# Patient Record
Sex: FEMALE | Race: BLACK OR AFRICAN AMERICAN | Employment: OTHER | URBAN - METROPOLITAN AREA
[De-identification: names, ages, dates, MRNs, and addresses within clinical notes are randomized per-mention and may not be internally consistent; named-entity substitution may affect disease eponyms.]

---

## 2020-01-06 ENCOUNTER — OFFICE VISIT (OUTPATIENT)
Dept: INTERNAL MEDICINE CLINIC | Age: 45
End: 2020-01-06

## 2020-01-06 VITALS
DIASTOLIC BLOOD PRESSURE: 70 MMHG | TEMPERATURE: 97.7 F | WEIGHT: 293 LBS | HEART RATE: 93 BPM | RESPIRATION RATE: 16 BRPM | BODY MASS INDEX: 47.09 KG/M2 | SYSTOLIC BLOOD PRESSURE: 104 MMHG | HEIGHT: 66 IN | OXYGEN SATURATION: 99 %

## 2020-01-06 DIAGNOSIS — Z13.1 SCREENING FOR DIABETES MELLITUS (DM): ICD-10-CM

## 2020-01-06 DIAGNOSIS — M15.9 OSTEOARTHRITIS OF MULTIPLE JOINTS, UNSPECIFIED OSTEOARTHRITIS TYPE: ICD-10-CM

## 2020-01-06 DIAGNOSIS — R10.13 EPIGASTRIC PAIN: ICD-10-CM

## 2020-01-06 DIAGNOSIS — Z79.899 LONG TERM CURRENT USE OF DIURETIC: ICD-10-CM

## 2020-01-06 DIAGNOSIS — Z76.89 ESTABLISHING CARE WITH NEW DOCTOR, ENCOUNTER FOR: ICD-10-CM

## 2020-01-06 DIAGNOSIS — R31.29 MICROSCOPIC HEMATURIA: ICD-10-CM

## 2020-01-06 DIAGNOSIS — F31.9 BIPOLAR AFFECTIVE DISORDER, REMISSION STATUS UNSPECIFIED (HCC): ICD-10-CM

## 2020-01-06 DIAGNOSIS — M79.7 FIBROMYALGIA: Primary | ICD-10-CM

## 2020-01-06 DIAGNOSIS — Z00.00 HEALTH CARE MAINTENANCE: ICD-10-CM

## 2020-01-06 DIAGNOSIS — J45.909 ASTHMA, UNSPECIFIED ASTHMA SEVERITY, UNSPECIFIED WHETHER COMPLICATED, UNSPECIFIED WHETHER PERSISTENT: ICD-10-CM

## 2020-01-06 DIAGNOSIS — F43.10 PTSD (POST-TRAUMATIC STRESS DISORDER): ICD-10-CM

## 2020-01-06 PROBLEM — E66.01 OBESITY, MORBID (HCC): Status: ACTIVE | Noted: 2020-01-06

## 2020-01-06 LAB
BILIRUB UR QL STRIP: NEGATIVE
GLUCOSE UR-MCNC: NEGATIVE MG/DL
HBA1C MFR BLD HPLC: 5.1 %
HGB BLD-MCNC: 109 G/DL
KETONES P FAST UR STRIP-MCNC: NEGATIVE MG/DL
PH UR STRIP: 7 [PH] (ref 4.6–8)
PROT UR QL STRIP: NEGATIVE
SP GR UR STRIP: 1.02 (ref 1–1.03)
UA UROBILINOGEN AMB POC: NORMAL (ref 0.2–1)
URINALYSIS CLARITY POC: CLEAR
URINALYSIS COLOR POC: YELLOW
URINE BLOOD POC: NORMAL
URINE LEUKOCYTES POC: NEGATIVE
URINE NITRITES POC: NEGATIVE

## 2020-01-06 RX ORDER — OXYCODONE HYDROCHLORIDE 20 MG/1
TABLET ORAL
COMMUNITY
Start: 2019-11-19

## 2020-01-06 NOTE — PROGRESS NOTES
Chief Complaint   Patient presents with   [facility] Establish Care     1. Have you been to the ER, urgent care clinic since your last visit? Hospitalized since your last visit? No    2. Have you seen or consulted any other health care providers outside of the [address] [place] [address] since your last visit? Include any pap smears or colon screening.  No

## 2020-01-06 NOTE — PATIENT INSTRUCTIONS
Return to get your blood work done as soon as possible on:    Monday - Thursday , 8 am to 12:00 noon.    Tell the [employer] you are just here for the lab.   ----------------------------------------------

## 2020-01-08 PROBLEM — Z98.84 HX OF BARIATRIC SURGERY: Status: ACTIVE | Noted: 2020-01-08

## 2020-01-08 NOTE — PROGRESS NOTES
CC: [facility]    HPI:     Ruba Torres is a 50 y.o. female s/p gastric bypass who presents with a chief complaint of [facility]   and with other medical problems:    FIBROMYALGIA  - generalized pain \"all over\" x 12 yrs; controlled in past through a Pain Management practice    DJD - HIPS, KNEES, ANKLE  - pain; dx'd 3 yrs ago    ASTHMA  - uses purple steroid inhaler every day, albuterol as needed    EPIGASTRIC PAIN  - X 8 YRS  - NO KNOWN LIVER, Pancreas, gastric or other abdominal disorder    PTSD  BIPOLAR  - X 10 - 20 YRS; controlled w/ Cymbalta, therapy  - no SI/HI    Allergies   Allergen Reactions    Nsaids (Non-Steroidal Anti-Inflammatory Drug) Other (comments)     Gastric bypass      Current Outpatient Medications on File Prior to Visit   Medication Sig Dispense Refill    oxyCODONE IR (ROXICODONE) 20 mg immediate release tablet TK 1 T PO Q 6 H PRN      DULoxetine (CYMBALTA) 60 mg capsule Take 60 mg by mouth daily.  melatonin 3 mg tablet Take 3 mg by mouth nightly.  IRON, FERROUS SULFATE, PO Take 27 mg by mouth daily.  hydrOXYzine (VISTARIL) 50 mg capsule Take 100 mg by mouth three (3) times daily as needed for Itching.  CALCIUM CITRATE/VITAMIN D2 (CALCIUM CITRATE WITH D PO) Take 1 Tab by mouth two (2) times a day.  pregabalin (LYRICA) 150 mg capsule Take 150 mg by mouth two (2) times a day.  albuterol (PROAIR HFA) 90 mcg/actuation inhaler Take 2 Puffs by inhalation every four (4) hours as needed for Wheezing.  multivitamin (ONE A DAY) tablet Take 1 Tab by mouth daily.  cyanocobalamin (VITAMIN B-12) 1,000 mcg tablet Take 1,000 mcg by mouth two (2) times a day.  HYDROcodone-acetaminophen (NORCO) 5-325 mg per tablet Take 1 Tab by mouth every six (6) hours as needed for Pain. Max Daily Amount: 4 Tabs. 5 Tab 0    furosemide (LASIX) 20 mg tablet Take 20 mg by mouth daily.  polyethylene glycol (MIRALAX) 17 gram packet Take 17 g by mouth daily.  esomeprazole (NEXIUM) 40 mg capsule Take 40 mg by mouth two (2) times a day. No current facility-administered medications on file prior to visit. ASSESSMENT  TREATMENT  PLAN:    1. Fibromyalgia  Chronic  - REFERRAL TO PAIN CLINIC    2. Osteoarthritis of multiple joints, unspecified osteoarthritis type  Need old records    3. Asthma, unspecified asthma severity, unspecified whether complicated, unspecified whether persistent  Controlled; Contin inhalers    4. PTSD (post-traumatic stress disorder)  5. Bipolar affective disorder, remission status unspecified ([facility]  Contin meds, therapy; psychiatrist for med management    6. Long term current use of diuretic  Check BMP  - GENERAL HEALTH PANEL    7. Microscopic hematuria  R.o UTI  - CULTURE, URINE    11. Epigastric pain  Unresolved  - Check lipase, old records    8. Health care maintenance  9. Screening for diabetes mellitus (DM)  10. Establishing care with new doctor, encounter for  - AMB POC HEMOGLOBIN (HGB)  - AMB POC HEMOGLOBIN A1C  - AMB POC URINALYSIS DIP STICK AUTO W/O MICRO  - GENERAL HEALTH PANEL    Patient Instructions   Return to get your blood work done as soon as possible on:    Monday - Thursday , 8 am to 12:00 noon. Tell the [employer] you are just here for the lab.   ----------------------------------------------      EXAM:    CONSTITUTIONAL:     Vitals:    01/06/20 1321   BP: 104/70   Pulse: 93   Resp: 16   Temp: 97.7 °F (36.5 °C)   TempSrc: Oral   SpO2: 99%   Weight: 321 lb (145.6 kg)   Height: 5' 6\" (1.676 m)   PainSc:   8   PainLoc: Generalized   :3  Weight Metrics 1/6/2020 7/22/2015 3/6/2015 12/16/2013 11/17/2013 3/31/2012 12/8/2011   Weight 321 lb 278 lb 7.1 oz 263 lb 7.2 oz 239 lb 3.2 oz 220 lb 207 lb 3.7 oz 195 lb   BMI 51.81 kg/m2 44.96 kg/m2 42.54 kg/m2 38.63 kg/m2 35.53 kg/m2 33.46 kg/m2 31.49 kg/m2     General:  WD morbidly obese appropriately groomed  female in NAD. EYES:   POSITIVE:  none.  Except for Positive findings listed, this system was WNL. My WNL exam this visit:   Conjunctivae & lids w/o erythema or discharge. EARS, NOSE, MOUTH & THROAT:   POSITIVE:  none. Except for Positive findings listed, this system was WNL. My WNL exam this visit:  External ears & nose WNL w/o scars, lesions or masses. HEAD & NECK:   POSITIVE:  none. Except for Positive findings listed, this system was WNL. My WNL exam this visit:  Atraumatic, normocephalic. Symmetrical w/o masses, tenderness, tracheal deviation, crepitus.  Thyroid w/o enlargement, tenderness, mass. RESPIRATORY:   POSITIVE:  none. Except for Positive findings listed, this system was WNL. My WNL exam this visit:  Effort WNL; no intercostal retractions or accessory muscle use; diaphragmatic movement WNL.  Breath sounds: good air entry, no adventitious sounds; no rales, wheezes, rhonchi or rubs.  No dullness, flatness or hyperresonance. CARDIOVASCULAR:  POSITIVE:  1/6 systolic murmur. PMI nonpalpable. Except for Positive findings listed, this system was WNL. My WNL exam this visit:    Heart - w/o thrills. PMI non-displaced.  S1, S2 normal rate, regular rhythm. No murmurs, gallops, clicks or rubs. Vascular  carotid pulses WNL; no bruits  abdominal aorta size WNL; no bruits  Extremities negative for edema or varicosities. GASTROINTESTINAL (Abdomen):   POSITIVE:  Obese. Except for Positive findings listed, this system was WNL. My WNL exam:  Flat; NABS w/o masses or tenderness  Liver 6 cm in RMCL. Liver & spleen w/o organomegaly.  No hernias palpable. SKIN & SUBCUTANEOUS TISSUE:  POSITIVE:  none. Except for Positive findings listed, this system was WNL. My WNL exam this visit:   No rashes, lesions, ulcers.  No induration, subcutaneous nodules, tightening. PSYCHIATRIC:  POSITIVE findings:  No SI/HI. Except for Positive findings listed, this system was WNL.  My WNL exam:  · Speech: rate, volume, articulation, coherence, and spontaneity WNL w/o perseveration, paucity of language or pressured speech  · Thought processes: Rate of thoughts, content of thoughts WNL; logical, not tangential.   · Associations: Intact; not loose, tangential, circumstantial.   · Abnormal or psychotic thoughts not currently present. No homicidal or suicidal ideation. No hallucinations, delusions, preoccupation with violence, obsessions   · Judgment concerning everyday activities & social situations, good. Insight concerning psychiatric condition, good  Mental status:  · Oriented to person, place and time. · Recent and remote memory good for recall of events, times and discussions during visit. · Attention span and concentration good. · Fund of knowledge: vocabulary, good. · Mood and affect: Not depressed or anxious w/o agitation, anger, aggression, hypomania or lability. HISTORY - ROS  PAST  FAMILY  SURGICAL  SOCIAL  with PROBLEM LIST:    ROS - Review of Systems    EXCEPT FOR POSITIVES LISTED, the Review of Systems below was negative or within normal limits  CONSTITUTIONAL:  Positive for: Chills x 6-8mos. Night sweats. Malaise. Wt gain. Fever  chills  night sweats  fatigue  malaise  weakness  anorexia  wt loss or gain  EYES:  Positive for: Glasses. Vision loss, blurred, double  color blind  discharge  irritation  glasses/contacts  eye exam:___/___ /____  H&NENT:  Positive for: Bad teth. Head trauma  deafness  tinnitus  vertigo  ear discharge or pain  rhinitis  sinusitis  nasal drainage or congestion  epistaxis  sore mouth / tongue / throat  tonsillitis  voice changes  hoarseness  bad teeth or gums  RESPIRATORY:  Positive for: SOB. Asthma. Productive cough. SOB  wheezing  cough  sputum  hemoptysis  asthma / COPD  pneumonia  TB/TB exposure  pleuritis  CARDIOVASCULAR:  Positivefor: Diaphoresis, BUENO, tachycardia or palpitations when asthma flares.   Chest pain  diaphoresis  BUENO  tachycardia  palpitations  LE edema  orthopnea PND  lightheaded  syncope  GASTROINTESTINAL:  Positive for: Abdominal pain, nausea. Nausea  vomiting  constipation  diarrhea  abdominal pain  hematochezia  melena  hematemesis  dysphagia  indigestion  acid reflux/GERD  hepatitis  liver problems  jaundice  GENITOURINARY:  Positive for: Urinates a lot, moderate amts. Frequency  dysuria  hematuria  urine odor  urethral/vaginal discharge  urgency  hesitancy  incomplete emptying  weak stream  SKINBREASTS:  Positive for: none. Rash  itching  whelps  bruises  sores  breast lumps  MUSCULOSKELETAL:  Positive for: Arthralgia, joint stiffness, muscle pain & weakness. Back pain. Joint pain, stiffness, effusion, limited movement  muscle pain, weakness  back neck pain  fracture(s)  NEUROLOGICAL:  Positive for: headaches, migraines, memory loss, gait difficulties. Headaches  migraines  dizzy, lightheaded  vertigo  seizure  memory loss  gait problems   HEMELYMPH:  Positive for: none. Bruise, bleed easily  bleeding gums  lymphadenopathy  ENDOCRINE:  Positive for: Polydipsia, heat intolerance. Polyuria  polydipsia  polyphagia  heat or cold intolerance  goiter  thyroid problems  PSYCHIATRIC:  Posiive for: Anxiety, depression, mood swings.   Anxiety  depression  suicidal or homicidal thoughts  mood swings      PFSSH:  Active Ambulatory Problems     Diagnosis Date Noted    Mood disorder due to a general medical condition 11/18/2013    Obesity, morbid ([facility] 01/06/2020     Resolved Ambulatory Problems     Diagnosis Date Noted    No Resolved Ambulatory Problems     Past Medical History:   Diagnosis Date    Asthma     Bipolar disorder ([facility]     Chronic pain     Fibromyalgia     Gastric ulcer     Ill-defined condition     Ill-defined condition     Ill-defined disease     Ischemic bowel disease ([facility]     PTSD (post-traumatic stress disorder)      Past Surgical History:   Procedure Laterality Date    HX BLADDER SUSPENSION  HX GASTRIC BYPASS      HX SMALL BOWEL RESECTION       Social History     Tobacco Use    Smoking status: Current Every Day Smoker     Packs/day: 1.00     Years: 10.00     Pack years: 10.00    Smokeless tobacco: Never Used   Substance Use Topics    Alcohol use: No     History reviewed. No pertinent family history. RESULTS - This Visit Only (only some results were available at the time of visit)  No results found for this visit on 01/06/20.

## 2020-03-31 LAB — CREATININE, EXTERNAL: 0.86

## 2020-08-20 VITALS
BODY MASS INDEX: 47.09 KG/M2 | SYSTOLIC BLOOD PRESSURE: 140 MMHG | WEIGHT: 293 LBS | DIASTOLIC BLOOD PRESSURE: 80 MMHG | HEIGHT: 66 IN

## 2020-08-20 PROBLEM — M25.562 PAIN IN BOTH KNEES: Status: ACTIVE | Noted: 2020-08-20

## 2020-08-20 PROBLEM — L30.4 INTERTRIGO: Status: ACTIVE | Noted: 2020-08-20

## 2020-08-20 PROBLEM — H92.02 OTALGIA OF LEFT EAR: Status: ACTIVE | Noted: 2020-08-20

## 2020-08-20 PROBLEM — M25.561 PAIN IN BOTH KNEES: Status: ACTIVE | Noted: 2020-08-20

## 2020-08-20 PROBLEM — R07.9 CHEST PAIN: Status: ACTIVE | Noted: 2020-08-20

## 2020-08-20 PROBLEM — L28.2 PRURITIC RASH: Status: ACTIVE | Noted: 2020-08-20

## 2020-08-20 PROBLEM — F17.200 TOBACCO DEPENDENCE SYNDROME: Status: ACTIVE | Noted: 2020-08-20

## 2020-08-20 PROBLEM — R05.9 COUGH: Status: ACTIVE | Noted: 2020-08-20

## 2020-08-20 RX ORDER — NALOXONE HYDROCHLORIDE 4 MG/.1ML
1 SPRAY NASAL
COMMUNITY

## 2020-08-20 RX ORDER — ONDANSETRON 8 MG/1
8 TABLET, ORALLY DISINTEGRATING ORAL
COMMUNITY

## 2020-08-20 RX ORDER — PREDNISONE 50 MG/1
50 TABLET ORAL DAILY
COMMUNITY

## 2020-08-20 RX ORDER — QUETIAPINE FUMARATE 100 MG/1
50 TABLET, FILM COATED ORAL 2 TIMES DAILY
COMMUNITY
End: 2021-02-12 | Stop reason: SDUPTHER

## 2020-08-20 RX ORDER — ACETAZOLAMIDE 250 MG/1
TABLET ORAL
COMMUNITY

## 2020-08-20 RX ORDER — TRAMADOL HYDROCHLORIDE 50 MG/1
50 TABLET ORAL
COMMUNITY

## 2020-08-20 RX ORDER — TRIAMCINOLONE ACETONIDE 1 MG/G
CREAM TOPICAL 2 TIMES DAILY
COMMUNITY

## 2020-08-20 RX ORDER — HALOPERIDOL 10 MG/1
10 TABLET ORAL
COMMUNITY

## 2020-08-20 RX ORDER — METOPROLOL TARTRATE 25 MG/1
TABLET, FILM COATED ORAL 2 TIMES DAILY
COMMUNITY
End: 2020-12-28

## 2020-10-21 ENCOUNTER — TELEPHONE (OUTPATIENT)
Dept: FAMILY MEDICINE CLINIC | Age: 45
End: 2020-10-21

## 2020-10-21 NOTE — TELEPHONE ENCOUNTER
Soy Pino with [employer] left message to give them a call regarding pt.   That she had been admitted to the hospital.

## 2020-11-13 ENCOUNTER — TELEPHONE (OUTPATIENT)
Dept: PALLATIVE CARE | Age: 45
End: 2020-11-13

## 2020-11-13 NOTE — TELEPHONE ENCOUNTER
[facility] Palliative Medicine Office  Nursing Note  [phone]  Fax [phone]     Name:  Ondina Peck  YOB: 1975     Pt called our office looking for a pain management specialist.  She states she has fibromyalgia, lupus, and arthritis. This nurse explained that our specialty Palliative practice sees patients that are in the latter stages of a serious progressive illness, and in addition to limited life expectancy, the patient has the presence of 1 or more COPE needs (Care Decisions, Overwhelming Symptoms, Psychosocial Distress, or End-Stage Disease). Our providers do not manage chronic pain. This nurse informed pt to look for a general pain management specialist.  One name listed in the old Nurse Navigator orientation manual is Dr. Dileep Ferro. [phone]. This nurse advised pt that she does not know the types of patients Dr. Sherine Bello accepts, but pt could call and inquire. If he cannot see patient perhaps his office will know of other pain management specialists in the area.      Pauline Luke, SMITHAN, RN  Palliative Medicine  [phone]

## 2020-12-28 RX ORDER — METOPROLOL TARTRATE 25 MG/1
TABLET, FILM COATED ORAL
Qty: 90 TAB | Refills: 0 | Status: SHIPPED | OUTPATIENT
Start: 2020-12-28

## 2020-12-28 RX ORDER — DULOXETIN HYDROCHLORIDE 30 MG/1
CAPSULE, DELAYED RELEASE ORAL
Qty: 90 CAP | Refills: 0 | Status: SHIPPED | OUTPATIENT
Start: 2020-12-28

## 2021-02-12 RX ORDER — QUETIAPINE FUMARATE 100 MG/1
50 TABLET, FILM COATED ORAL 2 TIMES DAILY
Qty: 60 TAB | Refills: 0 | Status: SHIPPED | OUTPATIENT
Start: 2021-02-12

## 2021-12-05 ENCOUNTER — HOSPITAL ENCOUNTER (EMERGENCY)
Age: 46
Discharge: HOME | End: 2021-12-05
Attending: EMERGENCY MEDICINE | Admitting: EMERGENCY MEDICINE
Payer: MEDICARE

## 2021-12-05 VITALS
RESPIRATION RATE: 16 BRPM | DIASTOLIC BLOOD PRESSURE: 104 MMHG | OXYGEN SATURATION: 98 % | HEART RATE: 107 BPM | SYSTOLIC BLOOD PRESSURE: 188 MMHG | TEMPERATURE: 98.7 F

## 2021-12-05 DIAGNOSIS — K08.89 DENTALGIA: Primary | ICD-10-CM

## 2021-12-05 PROCEDURE — 74011000250 HC RX REV CODE- 250: Performed by: NURSE PRACTITIONER

## 2021-12-05 PROCEDURE — 99283 EMERGENCY DEPT VISIT LOW MDM: CPT

## 2021-12-05 PROCEDURE — 74011250637 HC RX REV CODE- 250/637: Performed by: NURSE PRACTITIONER

## 2021-12-05 RX ORDER — TRAMADOL HYDROCHLORIDE 50 MG/1
50 TABLET ORAL
Qty: 12 TABLET | Refills: 0 | Status: SHIPPED | OUTPATIENT
Start: 2021-12-05 | End: 2021-12-08

## 2021-12-05 RX ORDER — PENICILLIN V POTASSIUM 500 MG/1
500 TABLET, FILM COATED ORAL 4 TIMES DAILY
Qty: 20 TABLET | Refills: 0 | Status: SHIPPED | OUTPATIENT
Start: 2021-12-05 | End: 2021-12-10

## 2021-12-05 RX ORDER — HYDROCODONE BITARTRATE AND ACETAMINOPHEN 5; 325 MG/1; MG/1
1 TABLET ORAL
Status: COMPLETED | OUTPATIENT
Start: 2021-12-05 | End: 2021-12-05

## 2021-12-05 RX ADMIN — BENZOCAINE: 200 SPRAY DENTAL; ORAL; PERIODONTAL at 20:29

## 2021-12-05 RX ADMIN — HYDROCODONE BITARTRATE AND ACETAMINOPHEN 1 TABLET: 5; 325 TABLET ORAL at 20:29

## 2021-12-05 NOTE — ED TRIAGE NOTES
Triage: Pt arrives ambulatory from home with CC of dental pain. She report she is being followed by an oral surgeon who is going to remove all of her teeth. She was supposed to have the surgery on 12/1 but there was a paper work error that prevented her from having it. She reports she finished her prescription of PCN this morning. She now has pain all throughout her mouth as well as into her face and head.

## 2021-12-06 NOTE — ED PROVIDER NOTES
69-year-old female presents to the ER today for evaluation of diffuse dentalgia and facial pain. Patient states that she has been following an oral surgeon and plans to have several tooth extractions, but there was an issue with her insurance which has resulted in the procedure being delayed. She was recently prescribed hydrocodone and penicillin and states that she ran out of both today and continues to be in pain. She denies any fevers, difficulty swallowing. Past Medical History:   Diagnosis Date    Asthma     Bipolar disorder ([facility]     Chest pain 8/20/2020    Chronic pain     Cough 8/20/2020    Fibromyalgia     Gastric ulcer     Ill-defined condition     Past history of DM \"gastric bypass made it go away\".      Ill-defined condition     fibromyalgia    Ill-defined disease     \"irregular heartbeat\"    Intertrigo 8/20/2020    Ischemic bowel disease ([facility]     Otalgia of left ear 8/20/2020    Pain in both knees 8/20/2020    Pruritic rash 8/20/2020    PTSD (post-traumatic stress disorder)     Tobacco dependence syndrome 8/20/2020       Past Surgical History:   Procedure Laterality Date    HX BLADDER SUSPENSION      HX GASTRIC BYPASS      HX OTHER SURGICAL      HX SMALL BOWEL RESECTION           Family History:   Problem Relation Age of Onset    Heart Disease Mother     Hypertension Father     Lung Disease Father     Cancer Father     Stroke Father        Social History     Socioeconomic History    Marital status:      Spouse name: Not on file    Number of children: Not on file    Years of education: Not on file    Highest education level: Not on file   Occupational History    Not on file   Tobacco Use    Smoking status: Current Every Day Smoker     Packs/day: 1.00     Years: 10.00     Pack years: 10.00    Smokeless tobacco: Never Used   Substance and Sexual Activity    Alcohol use: No    Drug use: No    Sexual activity: Not Currently   Other Topics Concern    Not on file   Social History Narrative    Not on file     Social Determinants of Health     Financial Resource Strain:     Difficulty of Paying Living Expenses: Not on file   Food Insecurity:     Worried About Running Out of Food in the Last Year: Not on file    Robinson of Food in the Last Year: Not on file   Transportation Needs:     Lack of Transportation (Medical): Not on file    Lack of Transportation (Non-Medical): Not on file   Physical Activity:     Days of Exercise per Week: Not on file    Minutes of Exercise per Session: Not on file   Stress:     Feeling of Stress : Not on file   Social Connections:     Frequency of Communication with Friends and Family: Not on file    Frequency of Social Gatherings with Friends and Family: Not on file    Attends Restoration Services: Not on file    Active Member of [address] [place] [address] [employer] or Organizations: Not on file    Attends Club or Organization Meetings: Not on file    Marital Status: Not on file   Intimate Partner Violence:     Fear of Current or Ex-Partner: Not on file    Emotionally Abused: Not on file    Physically Abused: Not on file    Sexually Abused: Not on file   Housing Stability:     Unable to Pay for Housing in the Last Year: Not on file    Number of [place] in the Last Year: Not on file    Unstable Housing in the Last Year: Not on file         ALLERGIES: Nsaids (non-steroidal anti-inflammatory drug)    Review of Systems   Constitutional: Negative for fever. HENT: Positive for dental problem. Negative for sore throat. Eyes: Negative for visual disturbance. Respiratory: Negative for shortness of breath. Cardiovascular: Negative for palpitations. Gastrointestinal: Negative for vomiting. Genitourinary: Negative for dysuria. Musculoskeletal: Negative for myalgias. Skin: Negative for rash. Neurological: Negative for dizziness. Psychiatric/Behavioral: Negative for dysphoric mood.        Vitals:    12/05/21 1833   BP: (!) 188/104 Pulse: (!) 107   Resp: 16   Temp: 98.7 °F (37.1 °C)   SpO2: 98%            Physical Exam  Vitals and nursing note reviewed. Constitutional:       General: She is in acute distress. Appearance: She is obese. She is not ill-appearing. Comments: Appears uncomfortable    HENT:      Head: Normocephalic. Nose: Nose normal.      Mouth/Throat:      Dentition: Abnormal dentition. Dental tenderness, gingival swelling and dental caries present. No dental abscesses. Comments: Very poor dentition with diffuse gingival swelling. No fluid collection or fluctuance identified with palpation. Eyes:      General: No scleral icterus. Extraocular Movements: Extraocular movements intact. Cardiovascular:      Rate and Rhythm: Normal rate and regular rhythm. Pulmonary:      Effort: Pulmonary effort is normal.   Abdominal:      General: There is no distension. Tenderness: There is no guarding. Musculoskeletal:         General: Normal range of motion. Cervical back: Normal range of motion and neck supple. Lymphadenopathy:      Head:      Right side of head: Submandibular adenopathy present. Left side of head: Submandibular adenopathy present. Skin:     General: Skin is warm and dry. Neurological:      Mental Status: She is alert and oriented to person, place, and time. Mental status is at baseline. Psychiatric:         Mood and Affect: Mood normal.         Behavior: Behavior normal.         Thought Content: Thought content normal.         Judgment: Judgment normal.          MDM      VITAL SIGNS:  Patient Vitals for the past 4 hrs:   Temp Pulse Resp BP SpO2   12/05/21 1833 98.7 °F (37.1 °C) (!) 107 16 (!) 188/104 98 %         LABS:  No results found for this or any previous visit (from the past 6 hour(s)).      IMAGING:  No orders to display         Medications During Visit:  Medications - No data to display      DECISION MAKING:  Anthony Melendez is a 48 y.o. female who comes in as above.    reviewed which is consistent with patient's story. Plan:  Extend penicillin prescription for additional 5 days, short course of analgesics, recommended urgent follow-up with her oral surgeon tomorrow. The clinical decision making for this encounter included ordering and interpreting the above diagnostic tests with comparison to prior studies that are within our EMR. Past medical and surgical histories were reviewed, as were records from recent outpatient and emergency department visits. The above results discussed and reviewed with the patient. Patient verbalized understanding of the care plan, including any changes to current outpatient medication regimen, discussed disease process, symptom control, and follow-up care. Return precautions reviewed. IMPRESSION:  1. Dentalgia        DISPOSITION:  Discharged      Current Discharge Medication List      START taking these medications    Details   !! traMADoL (Ultram) 50 mg tablet Take 1 Tablet by mouth every six (6) hours as needed for Pain for up to 3 days. Max Daily Amount: 200 mg. Qty: 12 Tablet, Refills: 0  Start date: 12/5/2021, End date: 12/8/2021    Associated Diagnoses: Dentalgia      aluminum-magnesium hydroxide 200-200 mg/5 mL susp 5 mL, diphenhydrAMINE 12.5 mg/5 mL liqd 12.5 mg, lidocaine 2 % soln 5 mL 15 mL by Swish and Spit route two (2) times a day for 3 days. Magic mouth wash   Maalox  Lidocaine 2% viscous   Diphenhydramine oral solution     Pharmacy to mix equal portions of ingredients to a total volume as indicated in the dispense amount. Qty: 50 mL, Refills: 0  Start date: 12/5/2021, End date: 12/8/2021      penicillin v potassium (VEETID) 500 mg tablet Take 1 Tablet by mouth four (4) times daily for 5 days. Qty: 20 Tablet, Refills: 0  Start date: 12/5/2021, End date: 12/10/2021       !! - Potential duplicate medications found. Please discuss with provider.       CONTINUE these medications which have NOT CHANGED Details   !! traMADoL (ULTRAM) 50 mg tablet Take 50 mg by mouth every six (6) hours as needed for Pain. !! - Potential duplicate medications found. Please discuss with provider. Follow-up Information     Follow up With Specialties Details Why Contact Info    Follow up with your Oral Surgeon tomorrow  Call               The patient is asked to follow-up with their primary care provider in the next several days. They are to call tomorrow for an appointment. The patient is asked to return promptly for any increased concerns or worsening of symptoms. They can return to this emergency department or any other emergency department.       Procedures

## 2021-12-06 NOTE — ED NOTES
Janine Pavon reviewed discharge instructions with the patient. The patient verbalized understanding.

## 2022-03-18 PROBLEM — E66.01 OBESITY, MORBID (HCC): Status: ACTIVE | Noted: 2020-01-06

## 2022-03-18 PROBLEM — F17.200 TOBACCO DEPENDENCE SYNDROME: Status: ACTIVE | Noted: 2020-08-20

## 2022-03-18 PROBLEM — L30.4 INTERTRIGO: Status: ACTIVE | Noted: 2020-08-20

## 2022-03-19 PROBLEM — R05.9 COUGH: Status: ACTIVE | Noted: 2020-08-20

## 2022-03-19 PROBLEM — R07.9 CHEST PAIN: Status: ACTIVE | Noted: 2020-08-20

## 2022-03-19 PROBLEM — M25.562 PAIN IN BOTH KNEES: Status: ACTIVE | Noted: 2020-08-20

## 2022-03-19 PROBLEM — H92.02 OTALGIA OF LEFT EAR: Status: ACTIVE | Noted: 2020-08-20

## 2022-03-19 PROBLEM — L28.2 PRURITIC RASH: Status: ACTIVE | Noted: 2020-08-20

## 2022-03-19 PROBLEM — M25.561 PAIN IN BOTH KNEES: Status: ACTIVE | Noted: 2020-08-20

## 2022-03-19 PROBLEM — K08.89 DENTALGIA: Status: ACTIVE | Noted: 2021-12-05

## 2022-03-20 PROBLEM — Z98.84 HX OF BARIATRIC SURGERY: Status: ACTIVE | Noted: 2020-01-08

## 2023-01-05 ENCOUNTER — APPOINTMENT (OUTPATIENT)
Dept: GENERAL RADIOLOGY | Age: 48
End: 2023-01-05
Attending: STUDENT IN AN ORGANIZED HEALTH CARE EDUCATION/TRAINING PROGRAM
Payer: MEDICARE

## 2023-01-05 ENCOUNTER — HOSPITAL ENCOUNTER (EMERGENCY)
Age: 48
Discharge: HOME | End: 2023-01-06
Attending: STUDENT IN AN ORGANIZED HEALTH CARE EDUCATION/TRAINING PROGRAM
Payer: MEDICARE

## 2023-01-05 DIAGNOSIS — M79.605 LEFT LEG PAIN: Primary | ICD-10-CM

## 2023-01-05 PROCEDURE — 99284 EMERGENCY DEPT VISIT MOD MDM: CPT

## 2023-01-05 PROCEDURE — 96372 THER/PROPH/DIAG INJ SC/IM: CPT

## 2023-01-05 PROCEDURE — 73562 X-RAY EXAM OF KNEE 3: CPT

## 2023-01-05 PROCEDURE — 73630 X-RAY EXAM OF FOOT: CPT

## 2023-01-05 PROCEDURE — 73590 X-RAY EXAM OF LOWER LEG: CPT

## 2023-01-05 RX ORDER — METHOCARBAMOL 500 MG/1
500 TABLET, FILM COATED ORAL ONCE
Status: COMPLETED | OUTPATIENT
Start: 2023-01-05 | End: 2023-01-06

## 2023-01-05 RX ORDER — LIDOCAINE 4 G/100G
1 PATCH TOPICAL ONCE
Status: DISCONTINUED | OUTPATIENT
Start: 2023-01-05 | End: 2023-01-06 | Stop reason: HOSPADM

## 2023-01-06 ENCOUNTER — APPOINTMENT (OUTPATIENT)
Dept: VASCULAR SURGERY | Age: 48
End: 2023-01-06
Attending: STUDENT IN AN ORGANIZED HEALTH CARE EDUCATION/TRAINING PROGRAM
Payer: MEDICARE

## 2023-01-06 VITALS
DIASTOLIC BLOOD PRESSURE: 80 MMHG | OXYGEN SATURATION: 100 % | SYSTOLIC BLOOD PRESSURE: 135 MMHG | TEMPERATURE: 97.7 F | RESPIRATION RATE: 20 BRPM | HEART RATE: 86 BPM

## 2023-01-06 PROCEDURE — 93971 EXTREMITY STUDY: CPT

## 2023-01-06 PROCEDURE — 74011250636 HC RX REV CODE- 250/636: Performed by: STUDENT IN AN ORGANIZED HEALTH CARE EDUCATION/TRAINING PROGRAM

## 2023-01-06 PROCEDURE — 96372 THER/PROPH/DIAG INJ SC/IM: CPT

## 2023-01-06 PROCEDURE — 74011000250 HC RX REV CODE- 250: Performed by: STUDENT IN AN ORGANIZED HEALTH CARE EDUCATION/TRAINING PROGRAM

## 2023-01-06 PROCEDURE — 74011250637 HC RX REV CODE- 250/637: Performed by: STUDENT IN AN ORGANIZED HEALTH CARE EDUCATION/TRAINING PROGRAM

## 2023-01-06 RX ORDER — DICLOFENAC SODIUM 10 MG/G
4 GEL TOPICAL 4 TIMES DAILY
Qty: 1 EACH | Refills: 3 | Status: SHIPPED | OUTPATIENT
Start: 2023-01-06

## 2023-01-06 RX ORDER — KETOROLAC TROMETHAMINE 30 MG/ML
30 INJECTION, SOLUTION INTRAMUSCULAR; INTRAVENOUS
Status: COMPLETED | OUTPATIENT
Start: 2023-01-06 | End: 2023-01-06

## 2023-01-06 RX ORDER — METHOCARBAMOL 750 MG/1
750 TABLET, FILM COATED ORAL 4 TIMES DAILY
Qty: 30 TABLET | Refills: 0 | Status: SHIPPED | OUTPATIENT
Start: 2023-01-06

## 2023-01-06 RX ADMIN — KETOROLAC TROMETHAMINE 30 MG: 30 INJECTION, SOLUTION INTRAMUSCULAR; INTRAVENOUS at 01:47

## 2023-01-06 RX ADMIN — METHOCARBAMOL 500 MG: 500 TABLET ORAL at 00:00
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
